# Patient Record
Sex: FEMALE | Race: WHITE | Employment: FULL TIME | URBAN - METROPOLITAN AREA
[De-identification: names, ages, dates, MRNs, and addresses within clinical notes are randomized per-mention and may not be internally consistent; named-entity substitution may affect disease eponyms.]

---

## 2017-02-08 ENCOUNTER — HOSPITAL ENCOUNTER (EMERGENCY)
Age: 47
Discharge: HOME | End: 2017-02-08
Attending: EMERGENCY MEDICINE
Payer: COMMERCIAL

## 2017-02-08 ENCOUNTER — APPOINTMENT (OUTPATIENT)
Dept: GENERAL RADIOLOGY | Age: 47
End: 2017-02-08
Attending: EMERGENCY MEDICINE
Payer: COMMERCIAL

## 2017-02-08 VITALS
HEIGHT: 63 IN | HEART RATE: 72 BPM | TEMPERATURE: 99 F | SYSTOLIC BLOOD PRESSURE: 126 MMHG | WEIGHT: 135 LBS | RESPIRATION RATE: 16 BRPM | DIASTOLIC BLOOD PRESSURE: 69 MMHG | BODY MASS INDEX: 23.92 KG/M2 | OXYGEN SATURATION: 97 %

## 2017-02-08 DIAGNOSIS — S82.892A ANKLE FRACTURE, LEFT, CLOSED, INITIAL ENCOUNTER: Primary | ICD-10-CM

## 2017-02-08 PROCEDURE — 73630 X-RAY EXAM OF FOOT: CPT

## 2017-02-08 PROCEDURE — 73610 X-RAY EXAM OF ANKLE: CPT

## 2017-02-08 PROCEDURE — 99284 EMERGENCY DEPT VISIT MOD MDM: CPT

## 2017-02-08 NOTE — ED PROVIDER NOTES
Patient Seen in: THE [facility] Emergency Department In [place]    History   Patient presents with:  Lower Extremity Injury (musculoskeletal)    Stated Complaint: left ankle injury    HPI    26-year-old white female who presents the emergency room today for b shows that there is some swelling and tenderness over the lateral malleolus. There is no medial malleolar tenderness. Patient has no fifth metatarsal pain. No midfoot pain is noted on exam.  She is a good dorsalis pedis pulse noted.   Patient can dorsifl Prescribed:  There are no discharge medications for this patient.

## 2017-02-09 ENCOUNTER — TELEPHONE (OUTPATIENT)
Dept: FAMILY MEDICINE CLINIC | Facility: CLINIC | Age: 47
End: 2017-02-09

## 2017-08-15 ENCOUNTER — TELEPHONE (OUTPATIENT)
Dept: FAMILY MEDICINE CLINIC | Facility: CLINIC | Age: 47
End: 2017-08-15

## 2017-08-15 NOTE — TELEPHONE ENCOUNTER
Pt called stated she continues to get calls from us and [place] out [place]. Pt asked to please not contact her anymore since she will no longer be our pt. Information given to supervisor Lizbeth Patel to fill out form.

## (undated) NOTE — ED AVS SNAPSHOT
[facility] in [address] [place] [address]    Phone:  [phone]    Fax:  [phone]   MRN: [id number]    Department:  [facility] in [place]   Date of Visit coverage for follow-up care and referrals. [facility] [employer] [place] [phone]  Pediatric [phone] Emergency Department   [phone]       To Check ER Wait Times:  TEXT 'ERwait' to 89933      Click [email or web address] will be contacted. Please make sure we have your correct phone number before you leave. After you leave, you should follow the attached instructions. I have read and understand the instructions given to me by my caregivers.         [facility] XR FOOT, COMPLETE (MIN 3 VIEWS), LEFT (CPT=73630) (Final result) Result time:  02/08/17 09:20:59    Final result    Impression:    CONCLUSION:  No acute visible fracture. See above description.              Dictated by: Christopher Garcia MD on 2/08/2017 at 9:19 concerning for an avulsion fracture with adjacent mild soft tissue swelling. Ankle mortise is symmetric. No dislocation.               MyChart     Visit MyChart  You can access your MyChart to more actively manage your health care and view more details from

## (undated) NOTE — ED AVS SNAPSHOT
THE [facility] Emergency Department in [address] [facility]    Phone:  [phone]    Fax:  [phone] [facility]   MRN: [id number]    Department:  THE [facility] Emergency Department in [place]   Date of Visit IF THERE IS ANY CHANGE OR WORSENING OF YOUR CONDITION, CALL YOUR PRIMARY CARE PHYSICIAN AT ONCE OR RETURN IMMEDIATELY TO THE EMERGENCY DEPARTMENT.     If you have been prescribed any medication(s), please fill your prescription right away and begin taking t